# Patient Record
Sex: FEMALE | Race: WHITE | URBAN - NONMETROPOLITAN AREA
[De-identification: names, ages, dates, MRNs, and addresses within clinical notes are randomized per-mention and may not be internally consistent; named-entity substitution may affect disease eponyms.]

---
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
## 2021-01-27 ENCOUNTER — TELEPHONE (OUTPATIENT)
Dept: OBGYN CLINIC | Age: 21
End: 2021-01-27

## 2021-01-27 DIAGNOSIS — N94.6 DYSMENORRHEA: ICD-10-CM

## 2021-01-27 RX ORDER — LEVONORGESTREL AND ETHINYL ESTRADIOL 0.1-0.02MG
1 KIT ORAL DAILY
Qty: 1 PACKET | Refills: 0 | Status: SHIPPED | OUTPATIENT
Start: 2021-01-27 | End: 2021-06-29 | Stop reason: ALTCHOICE

## 2021-01-27 NOTE — TELEPHONE ENCOUNTER
Pt is doing great on the med Kvng prescribed and she is coming in on 2-9 but will need a refill to get her through.

## 2021-02-09 ENCOUNTER — OFFICE VISIT (OUTPATIENT)
Dept: OBGYN CLINIC | Age: 21
End: 2021-02-09
Payer: COMMERCIAL

## 2021-02-09 VITALS
HEIGHT: 63 IN | SYSTOLIC BLOOD PRESSURE: 108 MMHG | WEIGHT: 257.2 LBS | BODY MASS INDEX: 45.57 KG/M2 | DIASTOLIC BLOOD PRESSURE: 76 MMHG

## 2021-02-09 DIAGNOSIS — N94.6 DYSMENORRHEA: Primary | ICD-10-CM

## 2021-02-09 PROCEDURE — 99213 OFFICE O/P EST LOW 20 MIN: CPT | Performed by: NURSE PRACTITIONER

## 2021-02-09 RX ORDER — NORETHINDRONE ACETATE AND ETHINYL ESTRADIOL 1MG-20(21)
1 KIT ORAL DAILY
Qty: 1 PACKET | Refills: 3 | Status: SHIPPED | OUTPATIENT
Start: 2021-02-09 | End: 2021-05-25 | Stop reason: SDUPTHER

## 2021-02-09 ASSESSMENT — ENCOUNTER SYMPTOMS
GASTROINTESTINAL NEGATIVE: 1
RESPIRATORY NEGATIVE: 1

## 2021-02-09 NOTE — PROGRESS NOTES
PROBLEM VISIT     Date of service: 2021    Marion Flores  Is a 24 y.o. female    PT's PCP is: . None (Inactive)     : 2000                                             Subjective:       Patient's last menstrual period was 2021. OB History    Para Term  AB Living   0 0 0 0 0 0   SAB TAB Ectopic Molar Multiple Live Births   0 0 0 0 0 0        Social History     Tobacco Use   Smoking Status Never Smoker   Smokeless Tobacco Never Used        Social History     Substance and Sexual Activity   Alcohol Use Never    Frequency: Never       Allergies: Amoxicillin      Current Outpatient Medications:     norethindrone-ethinyl estradiol (LOESTRIN FE ) 1-20 MG-MCG per tablet, Take 1 tablet by mouth daily, Disp: 1 packet, Rfl: 3    levonorgestrel-ethinyl estradiol (AVIANE) 0.1-20 MG-MCG per tablet, Take 1 tablet by mouth daily, Disp: 1 packet, Rfl: 0    Social History     Substance and Sexual Activity   Sexual Activity Yes    Partners: Male    Birth control/protection: Condom, OCP         Chief Complaint   Patient presents with    Follow-up     Follow up OCPs. Reports menses regular but cramping hasn't gotten any better. PE:  Vital Signs  Blood pressure 108/76, height 5' 3\" (1.6 m), weight (!) 257 lb 3.2 oz (116.7 kg), last menstrual period 2021. HPI: Patient presents today for med check. Reports regular menses but cramping has not improved. States she was emotional first two months of pills, but seems better now. PT denies fever, chills, nausea and vomiting       Review of Systems   Constitutional: Negative. Respiratory: Negative. Cardiovascular: Negative. Gastrointestinal: Negative. Genitourinary: Positive for menstrual problem (dysmenorrhea). Negative for dyspareunia, pelvic pain and vaginal discharge. Neurological: Negative. Physical Exam  Constitutional:       Appearance: Normal appearance. HENT:      Head: Normocephalic. Cardiovascular:      Rate and Rhythm: Normal rate and regular rhythm. Pulmonary:      Effort: Pulmonary effort is normal.      Breath sounds: Normal breath sounds. Musculoskeletal: Normal range of motion. Neurological:      General: No focal deficit present. Mental Status: She is alert. Psychiatric:         Mood and Affect: Mood normal.         Behavior: Behavior normal.         Assessment and Plan          Diagnosis Orders   1. Dysmenorrhea  norethindrone-ethinyl estradiol (LOESTRIN FE 1/20) 1-20 MG-MCG per tablet       will trial alternative pill since no improvement in cramping. Reviewed risks/benefits, aches       I am having Shiraztorrie Koko Contreras start on norethindrone-ethinyl estradiol. I am also having her maintain her levonorgestrel-ethinyl estradiol. Return in about 4 months (around 6/9/2021) for med check. There are no Patient Instructions on file for this visit.     Ky White,2/9/2021 4:47 PM

## 2021-05-25 ENCOUNTER — TELEPHONE (OUTPATIENT)
Dept: OBGYN CLINIC | Age: 21
End: 2021-05-25

## 2021-05-25 DIAGNOSIS — N94.6 DYSMENORRHEA: ICD-10-CM

## 2021-05-25 RX ORDER — NORETHINDRONE ACETATE AND ETHINYL ESTRADIOL 1MG-20(21)
1 KIT ORAL DAILY
Qty: 1 PACKET | Refills: 0 | Status: SHIPPED | OUTPATIENT
Start: 2021-05-25 | End: 2021-06-21 | Stop reason: SDUPTHER

## 2021-05-25 NOTE — TELEPHONE ENCOUNTER
Pt called to get one refill on her [facility]- only has three pills left and her annual appt isn't until 6/10.   Could someone check and send for her please and thanks

## 2021-05-26 ENCOUNTER — TELEPHONE (OUTPATIENT)
Dept: OBGYN CLINIC | Age: 21
End: 2021-05-26

## 2021-05-26 NOTE — TELEPHONE ENCOUNTER
Pt called to get one additional refill on [facility], her RX runs out Sat and appt is not until 6/10. She uses [facility] on [address] If someone could look into that please.   THANKS

## 2021-06-21 ENCOUNTER — TELEPHONE (OUTPATIENT)
Dept: OBGYN CLINIC | Age: 21
End: 2021-06-21

## 2021-06-21 DIAGNOSIS — N94.6 DYSMENORRHEA: ICD-10-CM

## 2021-06-21 RX ORDER — NORETHINDRONE ACETATE AND ETHINYL ESTRADIOL 1MG-20(21)
1 KIT ORAL DAILY
Qty: 1 PACKET | Refills: 0 | Status: SHIPPED | OUTPATIENT
Start: 2021-06-21 | End: 2021-06-29 | Stop reason: SDUPTHER

## 2021-06-29 ENCOUNTER — OFFICE VISIT (OUTPATIENT)
Dept: OBGYN CLINIC | Age: 21
End: 2021-06-29
Payer: COMMERCIAL

## 2021-06-29 VITALS
SYSTOLIC BLOOD PRESSURE: 112 MMHG | WEIGHT: 244.8 LBS | HEIGHT: 63 IN | BODY MASS INDEX: 43.38 KG/M2 | DIASTOLIC BLOOD PRESSURE: 74 MMHG

## 2021-06-29 DIAGNOSIS — N94.6 DYSMENORRHEA: ICD-10-CM

## 2021-06-29 PROCEDURE — 99213 OFFICE O/P EST LOW 20 MIN: CPT | Performed by: NURSE PRACTITIONER

## 2021-06-29 RX ORDER — NORETHINDRONE ACETATE AND ETHINYL ESTRADIOL 1MG-20(21)
1 KIT ORAL DAILY
Qty: 1 PACKET | Refills: 12 | Status: SHIPPED | OUTPATIENT
Start: 2021-06-29

## 2021-06-29 ASSESSMENT — ENCOUNTER SYMPTOMS
GASTROINTESTINAL NEGATIVE: 1
RESPIRATORY NEGATIVE: 1

## 2021-06-29 NOTE — PROGRESS NOTES
PROBLEM VISIT     Date of service: 2021    Marion Arcos  Is a 24 y.o. female    PT's PCP is: . None (Inactive)     : 2000                                             Subjective:       Patient's last menstrual period was 2021. OB History    Para Term  AB Living   0 0 0 0 0 0   SAB TAB Ectopic Molar Multiple Live Births   0 0 0 0 0 0        Social History     Tobacco Use   Smoking Status Never Smoker   Smokeless Tobacco Never Used        Social History     Substance and Sexual Activity   Alcohol Use Never       Allergies: Amoxicillin      Current Outpatient Medications:     norethindrone-ethinyl estradiol (LOESTRIN FE ) 1-20 MG-MCG per tablet, Take 1 tablet by mouth daily, Disp: 1 packet, Rfl: 12    Social History     Substance and Sexual Activity   Sexual Activity Yes    Partners: Male    Birth control/protection: Condom, OCP     Chief Complaint   Patient presents with    Follow-up     Follow on on LoEstrin  Fe for dysmenorrhea. Reports cramping is much better. PE:  Vital Signs  Blood pressure 112/74, height 5' 3\" (1.6 m), weight 244 lb 12.8 oz (111 kg), last menstrual period 2021. HPI: Patient presents for medication check. Reports cycles are well controlled and cramping is much improved. Desires to continue with current OCP. PT denies fever, chills, nausea and vomiting       Review of Systems   Constitutional: Negative. Respiratory: Negative. Cardiovascular: Negative. Gastrointestinal: Negative. Neurological: Negative. Physical Exam  Constitutional:       Appearance: Normal appearance. HENT:      Head: Normocephalic. Pulmonary:      Effort: Pulmonary effort is normal.   Musculoskeletal:         General: Normal range of motion. Neurological:      General: No focal deficit present. Mental Status: She is alert.    Psychiatric:         Mood and Affect: Mood normal.         Behavior: Behavior normal.

## 2022-08-02 ENCOUNTER — TELEPHONE (OUTPATIENT)
Dept: OBGYN CLINIC | Age: 22
End: 2022-08-02

## 2022-08-02 NOTE — TELEPHONE ENCOUNTER
Patient's Rx refill was denied earlier as patient was not scheduled for an appointment. Since she has been off of her pills for a week, does she need to wait until next cycle to start or ok to refill and have her start?

## 2022-08-02 NOTE — TELEPHONE ENCOUNTER
[facility] in [place] on [address]. Pt says she called a couple of days ago to get this refilled but no one has called back I did not see any message in regards to her calling however pt states she has been out of medication for over a week. She is scheduled to see MT 8-10 because she states she was not seen 6-29 and if she wasn't seen then an appt is necessary.  Please call her

## 2022-08-02 NOTE — TELEPHONE ENCOUNTER
I attempted to return patient's call and had to leave a message. Informed patient that her Rx was denied earlier as she needs to be seen yearly and she is overdue for an appointment. Since she has been out of medication for a week, we cannot just have her start a pack. She either needs to have an HCG or she needs to wait until her next cycle, but keep her upcoming visit to get refills. She is to call back to get an order for HCG if she chooses that option.

## 2022-08-10 ENCOUNTER — HOSPITAL ENCOUNTER (OUTPATIENT)
Age: 22
Setting detail: SPECIMEN
Discharge: HOME | End: 2022-08-10

## 2022-08-10 ENCOUNTER — OFFICE VISIT (OUTPATIENT)
Dept: OBGYN CLINIC | Age: 22
End: 2022-08-10
Payer: COMMERCIAL

## 2022-08-10 VITALS
WEIGHT: 275.2 LBS | DIASTOLIC BLOOD PRESSURE: 71 MMHG | HEIGHT: 63 IN | BODY MASS INDEX: 48.76 KG/M2 | SYSTOLIC BLOOD PRESSURE: 105 MMHG

## 2022-08-10 DIAGNOSIS — N94.6 DYSMENORRHEA: ICD-10-CM

## 2022-08-10 DIAGNOSIS — N94.6 DYSMENORRHEA: Primary | ICD-10-CM

## 2022-08-10 PROCEDURE — 99213 OFFICE O/P EST LOW 20 MIN: CPT | Performed by: NURSE PRACTITIONER

## 2022-08-10 RX ORDER — FLUOXETINE HYDROCHLORIDE 20 MG/1
CAPSULE ORAL
COMMUNITY
Start: 2022-07-19

## 2022-08-10 ASSESSMENT — ENCOUNTER SYMPTOMS
SHORTNESS OF BREATH: 0
CHEST TIGHTNESS: 0

## 2022-08-10 NOTE — PROGRESS NOTES
PROBLEM VISIT     Date of service: 8/10/2022    Marion Hendrickson Ax  Is a 22 y.o. female    PT's PCP is: . None (Inactive)     : 2000                                             Subjective:       Patient's last menstrual period was 2022. OB History    Para Term  AB Living   0 0 0 0 0 0   SAB IAB Ectopic Molar Multiple Live Births   0 0 0 0 0 0        Social History     Tobacco Use   Smoking Status Never   Smokeless Tobacco Never        Social History     Substance and Sexual Activity   Alcohol Use Never       Allergies: Amoxicillin      Current Outpatient Medications:     FLUoxetine (PROZAC) 20 MG capsule, , Disp: , Rfl:     norethindrone-ethinyl estradiol (LOESTRIN FE ) 1-20 MG-MCG per tablet, Take 1 tablet by mouth daily, Disp: 1 packet, Rfl: 12    Social History     Substance and Sexual Activity   Sexual Activity Yes    Partners: Male    Birth control/protection: Condom, OCP     Chief Complaint   Patient presents with    1 Year Follow Up     Annual ocp follow up. Would like to discuss different form of cycle control- sometimes forgets to take pills. PE:  Vital Signs  Blood pressure 105/71, height 5' 3\" (1.6 m), weight 275 lb 3.2 oz (124.8 kg), last menstrual period 2022. HPI: Patient here for medication follow up. Reports cycles are well controlled but forgets to take pills and would like to discuss alternative options. PT denies fever, chills, nausea and vomiting       Review of Systems   Constitutional: Negative. Respiratory:  Negative for chest tightness and shortness of breath. Cardiovascular:  Negative for chest pain and palpitations. Genitourinary:  Positive for menstrual problem (dysmenorrhea, currently controlled). Negative for dyspareunia, dysuria, frequency, pelvic pain, vaginal bleeding and vaginal discharge. Neurological:  Negative for dizziness, light-headedness and headaches.      Physical Exam  Constitutional:       Appearance: Normal appearance. She is obese. HENT:      Head: Normocephalic. Pulmonary:      Effort: Pulmonary effort is normal.   Musculoskeletal:         General: Normal range of motion. Neurological:      General: No focal deficit present. Mental Status: She is alert. Psychiatric:         Mood and Affect: Mood normal.         Behavior: Behavior normal.       Assessment and Plan          Diagnosis Orders   1. Dysmenorrhea  hCG, Quantitative, Pregnancy        Options available for management reviewed- patient desires trial of NuvaRing. With negative hcg will plan for quick start. I am having [address] [place] [address] maintain her norethindrone-ethinyl estradiol and FLUoxetine. Return in about 6 months (around 2/10/2023) for med check. There are no Patient Instructions on file for this visit.     Time spent 20 minutes      NENA Zavala NP,8/10/2022 3:58 PM

## 2022-08-11 LAB — HCG QUANTITATIVE: <1 MIU/ML

## 2022-08-22 ENCOUNTER — TELEPHONE (OUTPATIENT)
Dept: OBGYN CLINIC | Age: 22
End: 2022-08-22

## 2022-08-22 RX ORDER — ETONOGESTREL AND ETHINYL ESTRADIOL 11.7; 2.7 MG/1; MG/1
1 INSERT, EXTENDED RELEASE VAGINAL
Qty: 3 EACH | Refills: 1 | Status: SHIPPED | OUTPATIENT
Start: 2022-08-22

## 2022-08-22 NOTE — TELEPHONE ENCOUNTER
Patient left a message on the voicemail that she had an appointment earlier this month and was supposed to have a Rx sent to the pharmacy. She has called the pharmacy several times and nothing was sent in. She was here on 8/10 and it looks like she had a neg HCG. Does not appear that her HCG was ever signed off on and nothing was sent. She has been off her OCPs for over 2 weeks now. Can you please review and give recommendations on the next step?

## 2023-02-22 ENCOUNTER — HOSPITAL ENCOUNTER (OUTPATIENT)
Age: 23
Setting detail: SPECIMEN
Discharge: HOME | End: 2023-02-22

## 2023-02-22 LAB
25(OH)D3 SERPL-MCNC: 21.1 NG/ML
ALBUMIN SERPL-MCNC: 4.4 G/DL (ref 3.5–5.2)
ALBUMIN/GLOBULIN RATIO: 1.4 (ref 1–2.5)
ALP SERPL-CCNC: 94 U/L (ref 35–104)
ALT SERPL-CCNC: 20 U/L (ref 5–33)
ANION GAP SERPL CALCULATED.3IONS-SCNC: 13 MMOL/L (ref 9–17)
AST SERPL-CCNC: 18 U/L
BILIRUB SERPL-MCNC: 0.7 MG/DL (ref 0.3–1.2)
BUN SERPL-MCNC: 13 MG/DL (ref 6–20)
CALCIUM SERPL-MCNC: 9.5 MG/DL (ref 8.6–10.4)
CHLORIDE SERPL-SCNC: 100 MMOL/L (ref 98–107)
CHOLEST SERPL-MCNC: 185 MG/DL
CHOLESTEROL/HDL RATIO: 4
CO2 SERPL-SCNC: 25 MMOL/L (ref 20–31)
CREAT SERPL-MCNC: 0.61 MG/DL (ref 0.5–0.9)
GFR SERPL CREATININE-BSD FRML MDRD: >60 ML/MIN/1.73M2
GLUCOSE SERPL-MCNC: 87 MG/DL (ref 70–99)
HCT VFR BLD AUTO: 40 % (ref 36.3–47.1)
HDLC SERPL-MCNC: 46 MG/DL
HGB BLD-MCNC: 13.2 G/DL (ref 11.9–15.1)
LDLC SERPL CALC-MCNC: 115 MG/DL (ref 0–130)
MCH RBC QN AUTO: 27.9 PG (ref 25.2–33.5)
MCHC RBC AUTO-ENTMCNC: 33 G/DL (ref 28.4–34.8)
MCV RBC AUTO: 84.6 FL (ref 82.6–102.9)
NRBC AUTOMATED: 0 PER 100 WBC
PDW BLD-RTO: 12.8 % (ref 11.8–14.4)
PLATELET # BLD AUTO: 408 K/UL (ref 138–453)
PMV BLD AUTO: 9.4 FL (ref 8.1–13.5)
POTASSIUM SERPL-SCNC: 4 MMOL/L (ref 3.7–5.3)
PROT SERPL-MCNC: 7.5 G/DL (ref 6.4–8.3)
RBC # BLD: 4.73 M/UL (ref 3.95–5.11)
SODIUM SERPL-SCNC: 138 MMOL/L (ref 135–144)
T4 FREE SERPL-MCNC: 1.22 NG/DL (ref 0.93–1.7)
TRIGL SERPL-MCNC: 122 MG/DL
TSH SERPL-ACNC: 5.48 UIU/ML (ref 0.3–5)
WBC # BLD AUTO: 11 K/UL (ref 4.5–13.5)

## 2023-02-23 LAB
FOLATE SERPL-MCNC: >20 NG/ML
INSULIN COMMENT: NORMAL
INSULIN REFERENCE RANGE:: NORMAL
INSULIN: 15.7 MU/L
VIT B12 SERPL-MCNC: 632 PG/ML (ref 232–1245)

## 2023-02-27 ENCOUNTER — TELEPHONE (OUTPATIENT)
Dept: OBGYN CLINIC | Age: 23
End: 2023-02-27

## 2023-02-27 ENCOUNTER — OFFICE VISIT (OUTPATIENT)
Dept: OBGYN CLINIC | Age: 23
End: 2023-02-27
Payer: COMMERCIAL

## 2023-02-27 VITALS
DIASTOLIC BLOOD PRESSURE: 76 MMHG | BODY MASS INDEX: 51.91 KG/M2 | HEIGHT: 63 IN | SYSTOLIC BLOOD PRESSURE: 112 MMHG | WEIGHT: 293 LBS

## 2023-02-27 DIAGNOSIS — N94.6 DYSMENORRHEA: Primary | ICD-10-CM

## 2023-02-27 DIAGNOSIS — N92.0 MENORRHAGIA WITH REGULAR CYCLE: ICD-10-CM

## 2023-02-27 PROCEDURE — 99213 OFFICE O/P EST LOW 20 MIN: CPT | Performed by: NURSE PRACTITIONER

## 2023-02-27 RX ORDER — ESCITALOPRAM OXALATE 10 MG/1
TABLET ORAL
COMMUNITY
Start: 2023-02-13

## 2023-02-27 ASSESSMENT — ENCOUNTER SYMPTOMS
RESPIRATORY NEGATIVE: 1
GASTROINTESTINAL NEGATIVE: 1

## 2023-02-27 NOTE — PROGRESS NOTES
PROBLEM VISIT     Date of service: 2023    Marion Craig  Is a 25 y.o. female    PT's PCP is: . None (Inactive)     : 2000                                             Subjective:       Patient's last menstrual period was 2023. OB History    Para Term  AB Living   0 0 0 0 0 0   SAB IAB Ectopic Molar Multiple Live Births   0 0 0 0 0 0        Social History     Tobacco Use   Smoking Status Never   Smokeless Tobacco Never        Social History     Substance and Sexual Activity   Alcohol Use Never       Allergies: Amoxicillin      Current Outpatient Medications:     escitalopram (LEXAPRO) 10 MG tablet, , Disp: , Rfl:     Social History     Substance and Sexual Activity   Sexual Activity Yes    Partners: Male    Birth control/protection: Condom, OCP     Chief Complaint   Patient presents with    Dysmenorrhea     Discuss IUD for dysmenorrhea and menorrhagia. PE:  Vital Signs  Blood pressure 112/76, height 5' 3\" (1.6 m), weight (!) 302 lb 9.6 oz (137.3 kg), last menstrual period 2023. HPI: Patient presents today to discuss alternative cycle control options. She expresses interest in a IUD for control of dysmenorrhea and menorrhagia. Menses monthly and lasting around 5 days with associated cramping. Heavy the first 2-3 days. PT denies fever, chills, nausea and vomiting       Review of Systems   Constitutional: Negative. Respiratory: Negative. Cardiovascular: Negative. Gastrointestinal: Negative. Genitourinary:  Positive for menstrual problem. Negative for difficulty urinating, dysuria, frequency, pelvic pain, vaginal bleeding, vaginal discharge and vaginal pain. Physical Exam  Constitutional:       General: She is not in acute distress. Appearance: Normal appearance. She is obese. She is not ill-appearing. HENT:      Head: Normocephalic.    Pulmonary:      Effort: Pulmonary effort is normal.   Musculoskeletal:         General: Normal range of motion. Neurological:      General: No focal deficit present. Mental Status: She is alert. Psychiatric:         Mood and Affect: Mood normal.         Behavior: Behavior normal.       Assessment and Plan          Diagnosis Orders   1. Dysmenorrhea  hCG, Quantitative, Pregnancy      2. Menorrhagia with regular cycle          Reports heavy and painful menses, desires to discuss treatment options. Reviewed options available including OCP's (tried in the past, forgot to take often), patch (not good candidate due to >90kg), NuvaRing (gave her vaginal infections), Nexplanon, Depo, IUD. Patient desires [place] IUD. She denies risk factors for use. Reviewed risks/benefits, administration, common side effects and bleeding patterns, aches. She is aware I like to plan insertion following menses and she will need hcg the day prior to insertion. I have discontinued Marion Martino's norethindrone-ethinyl estradiol, FLUoxetine, and etonogestrel-ethinyl estradiol. I am also having her maintain her escitalopram.    Return for iud insertion. There are no Patient Instructions on file for this visit.     Time spent 20 minutes      NENA Goss NP,2/28/2023 9:22 AM

## 2023-04-17 ENCOUNTER — TELEPHONE (OUTPATIENT)
Dept: OBGYN CLINIC | Age: 23
End: 2023-04-17

## 2023-04-17 NOTE — TELEPHONE ENCOUNTER
Pt has a copay for her Liletta device over $800 so she would like to wait until closer to end of year to possible have inserted when her deductible might be met. She would like to restart the OCP that you had her on last and she would like to have script sent to [facility] in [place]. She is aware of need for SPT before script will be sent and she will have drawn at [facility] in [facility] which is closer to her home. I told her a note will be sent to you and someone will reach out to her with your reply.

## 2023-04-19 DIAGNOSIS — N92.0 MENORRHAGIA WITH REGULAR CYCLE: ICD-10-CM

## 2023-04-19 DIAGNOSIS — N94.6 DYSMENORRHEA: Primary | ICD-10-CM

## 2023-04-26 ENCOUNTER — HOSPITAL ENCOUNTER (OUTPATIENT)
Age: 23
Setting detail: SPECIMEN
Discharge: HOME | End: 2023-04-26

## 2023-04-26 DIAGNOSIS — N92.0 MENORRHAGIA WITH REGULAR CYCLE: ICD-10-CM

## 2023-04-26 DIAGNOSIS — N94.6 DYSMENORRHEA: ICD-10-CM

## 2023-04-26 LAB — HCG QUANTITATIVE: <1 MIU/ML

## 2023-08-09 ENCOUNTER — TELEPHONE (OUTPATIENT)
Dept: OBGYN CLINIC | Age: 23
End: 2023-08-09

## 2023-08-09 NOTE — TELEPHONE ENCOUNTER
I keep getting refill requests from pharmacy. Please let patient know if she desires additional OCP refills she needs schedule a med check/first annual. Thanks.

## 2023-08-15 RX ORDER — NORETHINDRONE ACETATE AND ETHINYL ESTRADIOL AND FERROUS FUMARATE 1MG-20(21)
KIT ORAL
Qty: 28 TABLET | OUTPATIENT
Start: 2023-08-15

## 2023-08-22 RX ORDER — NORETHINDRONE ACETATE AND ETHINYL ESTRADIOL AND FERROUS FUMARATE 1MG-20(21)
KIT ORAL
Qty: 28 TABLET | OUTPATIENT
Start: 2023-08-22